# Patient Record
Sex: FEMALE | NOT HISPANIC OR LATINO | URBAN - METROPOLITAN AREA
[De-identification: names, ages, dates, MRNs, and addresses within clinical notes are randomized per-mention and may not be internally consistent; named-entity substitution may affect disease eponyms.]

---
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
## 2024-05-08 ENCOUNTER — OFFICE VISIT (OUTPATIENT)
Age: 24
End: 2024-05-08
Payer: COMMERCIAL

## 2024-05-08 VITALS
TEMPERATURE: 98.3 F | DIASTOLIC BLOOD PRESSURE: 88 MMHG | SYSTOLIC BLOOD PRESSURE: 171 MMHG | HEART RATE: 78 BPM | HEIGHT: 68 IN | RESPIRATION RATE: 18 BRPM | OXYGEN SATURATION: 99 % | WEIGHT: 293 LBS | BODY MASS INDEX: 44.41 KG/M2

## 2024-05-08 DIAGNOSIS — M77.8 THUMB TENDONITIS: Primary | ICD-10-CM

## 2024-05-08 PROCEDURE — G0382 LEV 3 HOSP TYPE B ED VISIT: HCPCS | Performed by: PHYSICIAN ASSISTANT

## 2024-05-08 PROCEDURE — S9083 URGENT CARE CENTER GLOBAL: HCPCS | Performed by: PHYSICIAN ASSISTANT

## 2024-05-08 RX ORDER — FLUOXETINE HYDROCHLORIDE 20 MG/1
CAPSULE ORAL
COMMUNITY
Start: 2024-03-25

## 2024-05-08 RX ORDER — LAMOTRIGINE 100 MG/1
TABLET ORAL
COMMUNITY
Start: 2024-04-01

## 2024-05-08 RX ORDER — ATOMOXETINE 25 MG/1
25 CAPSULE ORAL DAILY
COMMUNITY

## 2024-05-08 RX ORDER — DOXYCYCLINE HYCLATE 50 MG/1
CAPSULE ORAL
COMMUNITY
Start: 2024-02-04

## 2024-05-08 RX ORDER — FLUOXETINE 10 MG/1
CAPSULE ORAL
COMMUNITY
Start: 2024-02-02

## 2024-05-08 NOTE — PROGRESS NOTES
[facility]        NAME: Mignon Liang is a 24 y.o. female  : 2000    MRN: [id number]  DATE: May 8, 2024  TIME: 7:09 PM    Assessment and Plan   No primary diagnosis found.  No diagnosis found.      Patient Instructions       Follow up with PCP in 3-5 days.  Proceed to  ER if symptoms worsen.    If tests have been performed at [facility], our office will contact you with results if changes need to be made to the care plan discussed with you at the visit.  You can review your full results on [facility].    Chief Complaint     Chief Complaint   Patient presents with    Hand Pain     Stated last week. Patient denies any injuries to her L hand. Patient noted swelling on her L hand. Reports taking motrin for the pain. Pain 7/10.          History of Present Illness       HPI    Review of Systems   Review of Systems      Current Medications       Current Outpatient Medications:     atoMOXetine (STRATTERA) 25 mg capsule, Take 25 mg by mouth daily, Disp: , Rfl:     FLUoxetine (PROzac) 20 mg capsule, , Disp: , Rfl:     lamoTRIgine (LaMICtal) 100 mg tablet, , Disp: , Rfl:     doxycycline hyclate (VIBRAMYCIN) 50 mg capsule, , Disp: , Rfl:     FLUoxetine (PROzac) 10 mg capsule, , Disp: , Rfl:     Current Allergies     Allergies as of 2024 - Reviewed 2024   Allergen Reaction Noted    Penicillins Hives and Swelling 2016    Sulfamethoxazole Hives and Rash 2016    Sulfamethoxazole-trimethoprim Hives and Swelling 2016    Dust mite extract Other (See Comments) 2023    Pollen extract Hives 2023    Amoxicillin-pot clavulanate Dermatitis and Rash 2016    Cefdinir Dermatitis, Rash, and Swelling 2016    Clarithromycin Hives 2016            The following portions of the patient's history were reviewed and updated as appropriate: allergies, current medications, past family history, past medical history, past social history, past surgical history and problem  "list.     Past Medical History:   Diagnosis Date    ADHD (attention deficit hyperactivity disorder)     Anxiety     Bipolar affective (HCC)     Depression        Past Surgical History:   Procedure Laterality Date    GALLBLADDER SURGERY      2014    WISDOM TOOTH EXTRACTION         Family History   Problem Relation Age of Onset    Depression Mother     Atrial fibrillation Mother          Medications have been verified.        Objective   BP (!) 171/88   Pulse 78   Temp 98.3 °F (36.8 °C) (Tympanic)   Resp 18   Ht 5' 8\" (1.727 m)   Wt (!) 139 kg (305 lb 6.4 oz)   LMP  (LMP Unknown) Comment: LMP 2 years ago  SpO2 99%   BMI 46.44 kg/m²   No LMP recorded (lmp unknown). (Menstrual status: Birth Control).       Physical Exam     Physical Exam              "    Neurological:      Mental Status: She is alert and oriented to person, place, and time.      Sensory: No sensory deficit.

## 2024-06-18 ENCOUNTER — OFFICE VISIT (OUTPATIENT)
Age: 24
End: 2024-06-18
Payer: COMMERCIAL

## 2024-06-18 ENCOUNTER — APPOINTMENT (OUTPATIENT)
Age: 24
End: 2024-06-18
Payer: COMMERCIAL

## 2024-06-18 VITALS
SYSTOLIC BLOOD PRESSURE: 138 MMHG | RESPIRATION RATE: 20 BRPM | WEIGHT: 290 LBS | TEMPERATURE: 97.9 F | BODY MASS INDEX: 43.95 KG/M2 | OXYGEN SATURATION: 98 % | HEART RATE: 97 BPM | DIASTOLIC BLOOD PRESSURE: 88 MMHG | HEIGHT: 68 IN

## 2024-06-18 DIAGNOSIS — J45.41 MODERATE PERSISTENT ASTHMA WITH ACUTE EXACERBATION: Primary | ICD-10-CM

## 2024-06-18 DIAGNOSIS — L73.2 HIDRADENITIS SUPPURATIVA: ICD-10-CM

## 2024-06-18 PROBLEM — J45.40 MODERATE PERSISTENT ASTHMA WITHOUT COMPLICATION: Status: ACTIVE | Noted: 2017-12-27

## 2024-06-18 LAB
ALBUMIN SERPL BCG-MCNC: 4.2 G/DL (ref 3.5–5)
ALP SERPL-CCNC: 126 U/L (ref 34–104)
ALT SERPL W P-5'-P-CCNC: 16 U/L (ref 7–52)
ANION GAP SERPL CALCULATED.3IONS-SCNC: 10 MMOL/L (ref 4–13)
AST SERPL W P-5'-P-CCNC: 16 U/L (ref 13–39)
BASOPHILS # BLD AUTO: 0.03 THOUSANDS/ÂΜL (ref 0–0.1)
BASOPHILS NFR BLD AUTO: 0 % (ref 0–1)
BILIRUB DIRECT SERPL-MCNC: 0.19 MG/DL (ref 0–0.2)
BILIRUB SERPL-MCNC: 0.71 MG/DL (ref 0.2–1)
BUN SERPL-MCNC: 8 MG/DL (ref 5–25)
CALCIUM SERPL-MCNC: 9.4 MG/DL (ref 8.4–10.2)
CHLORIDE SERPL-SCNC: 107 MMOL/L (ref 96–108)
CHOLEST SERPL-MCNC: 147 MG/DL
CO2 SERPL-SCNC: 24 MMOL/L (ref 21–32)
CREAT SERPL-MCNC: 0.61 MG/DL (ref 0.6–1.3)
EOSINOPHIL # BLD AUTO: 0.12 THOUSAND/ÂΜL (ref 0–0.61)
EOSINOPHIL NFR BLD AUTO: 1 % (ref 0–6)
ERYTHROCYTE [DISTWIDTH] IN BLOOD BY AUTOMATED COUNT: 13.1 % (ref 11.6–15.1)
GFR SERPL CREATININE-BSD FRML MDRD: 129 ML/MIN/1.73SQ M
GLUCOSE P FAST SERPL-MCNC: 88 MG/DL (ref 65–99)
HBV CORE AB SER QL: NORMAL
HBV SURFACE AG SER QL: NORMAL
HCT VFR BLD AUTO: 39.4 % (ref 34.8–46.1)
HCV AB SER QL: NORMAL
HDLC SERPL-MCNC: 47 MG/DL
HGB BLD-MCNC: 13.3 G/DL (ref 11.5–15.4)
IMM GRANULOCYTES # BLD AUTO: 0.04 THOUSAND/UL (ref 0–0.2)
IMM GRANULOCYTES NFR BLD AUTO: 0 % (ref 0–2)
LDLC SERPL CALC-MCNC: 88 MG/DL (ref 0–100)
LYMPHOCYTES # BLD AUTO: 2.29 THOUSANDS/ÂΜL (ref 0.6–4.47)
LYMPHOCYTES NFR BLD AUTO: 19 % (ref 14–44)
MCH RBC QN AUTO: 30.8 PG (ref 26.8–34.3)
MCHC RBC AUTO-ENTMCNC: 33.8 G/DL (ref 31.4–37.4)
MCV RBC AUTO: 91 FL (ref 82–98)
MONOCYTES # BLD AUTO: 0.7 THOUSAND/ÂΜL (ref 0.17–1.22)
MONOCYTES NFR BLD AUTO: 6 % (ref 4–12)
NEUTROPHILS # BLD AUTO: 9.08 THOUSANDS/ÂΜL (ref 1.85–7.62)
NEUTS SEG NFR BLD AUTO: 74 % (ref 43–75)
NONHDLC SERPL-MCNC: 100 MG/DL
NRBC BLD AUTO-RTO: 0 /100 WBCS
PLATELET # BLD AUTO: 363 THOUSANDS/UL (ref 149–390)
PMV BLD AUTO: 12 FL (ref 8.9–12.7)
POTASSIUM SERPL-SCNC: 3.9 MMOL/L (ref 3.5–5.3)
PROT SERPL-MCNC: 6.9 G/DL (ref 6.4–8.4)
RBC # BLD AUTO: 4.32 MILLION/UL (ref 3.81–5.12)
SODIUM SERPL-SCNC: 141 MMOL/L (ref 135–147)
TRIGL SERPL-MCNC: 61 MG/DL
WBC # BLD AUTO: 12.26 THOUSAND/UL (ref 4.31–10.16)

## 2024-06-18 PROCEDURE — 85025 COMPLETE CBC W/AUTO DIFF WBC: CPT

## 2024-06-18 PROCEDURE — 86706 HEP B SURFACE ANTIBODY: CPT

## 2024-06-18 PROCEDURE — 80061 LIPID PANEL: CPT

## 2024-06-18 PROCEDURE — 87340 HEPATITIS B SURFACE AG IA: CPT

## 2024-06-18 PROCEDURE — S9083 URGENT CARE CENTER GLOBAL: HCPCS | Performed by: PHYSICIAN ASSISTANT

## 2024-06-18 PROCEDURE — 36415 COLL VENOUS BLD VENIPUNCTURE: CPT

## 2024-06-18 PROCEDURE — 80048 BASIC METABOLIC PNL TOTAL CA: CPT

## 2024-06-18 PROCEDURE — 80076 HEPATIC FUNCTION PANEL: CPT

## 2024-06-18 PROCEDURE — G0382 LEV 3 HOSP TYPE B ED VISIT: HCPCS | Performed by: PHYSICIAN ASSISTANT

## 2024-06-18 PROCEDURE — 86803 HEPATITIS C AB TEST: CPT

## 2024-06-18 PROCEDURE — 86704 HEP B CORE ANTIBODY TOTAL: CPT

## 2024-06-18 RX ORDER — ALBUTEROL SULFATE 90 UG/1
2 AEROSOL, METERED RESPIRATORY (INHALATION) EVERY 6 HOURS PRN
Qty: 8.5 G | Refills: 0 | Status: SHIPPED | OUTPATIENT
Start: 2024-06-18 | End: 2024-06-20 | Stop reason: SDUPTHER

## 2024-06-18 RX ORDER — BUDESONIDE AND FORMOTEROL FUMARATE DIHYDRATE 160; 4.5 UG/1; UG/1
2 AEROSOL RESPIRATORY (INHALATION) 2 TIMES DAILY
Qty: 10.2 G | Refills: 0 | Status: SHIPPED | OUTPATIENT
Start: 2024-06-18 | End: 2024-06-20 | Stop reason: SDUPTHER

## 2024-06-19 LAB — HBV SURFACE AB SER-ACNC: 13 MIU/ML

## 2024-06-19 RX ORDER — CLINDAMYCIN PHOSPHATE 10 MG/G
GEL TOPICAL
COMMUNITY
Start: 2024-05-22

## 2024-06-19 RX ORDER — CLINDAMYCIN PHOSPHATE 10 UG/ML
LOTION TOPICAL
COMMUNITY
Start: 2024-06-15

## 2024-06-19 RX ORDER — IBUPROFEN 200 MG
200 TABLET ORAL EVERY 6 HOURS PRN
COMMUNITY

## 2024-06-19 RX ORDER — LORATADINE 10 MG/1
10 TABLET ORAL
COMMUNITY

## 2024-06-19 RX ORDER — FEXOFENADINE HCL 180 MG/1
180 TABLET ORAL DAILY PRN
COMMUNITY
End: 2024-06-20 | Stop reason: ALTCHOICE

## 2024-06-19 RX ORDER — ONDANSETRON 4 MG/1
TABLET, ORALLY DISINTEGRATING ORAL
COMMUNITY
Start: 2024-03-20

## 2024-06-20 ENCOUNTER — TELEPHONE (OUTPATIENT)
Dept: ADMINISTRATIVE | Facility: OTHER | Age: 24
End: 2024-06-20

## 2024-06-20 ENCOUNTER — OFFICE VISIT (OUTPATIENT)
Age: 24
End: 2024-06-20
Payer: COMMERCIAL

## 2024-06-20 VITALS
HEIGHT: 68 IN | WEIGHT: 291.2 LBS | DIASTOLIC BLOOD PRESSURE: 82 MMHG | BODY MASS INDEX: 44.13 KG/M2 | OXYGEN SATURATION: 99 % | SYSTOLIC BLOOD PRESSURE: 118 MMHG | HEART RATE: 74 BPM

## 2024-06-20 DIAGNOSIS — E28.2 PCOS (POLYCYSTIC OVARIAN SYNDROME): ICD-10-CM

## 2024-06-20 DIAGNOSIS — J45.41 MODERATE PERSISTENT ASTHMA WITH ACUTE EXACERBATION: ICD-10-CM

## 2024-06-20 DIAGNOSIS — J45.40 MODERATE PERSISTENT ASTHMA WITHOUT COMPLICATION: ICD-10-CM

## 2024-06-20 DIAGNOSIS — Z00.00 WELL ADULT EXAM: ICD-10-CM

## 2024-06-20 DIAGNOSIS — J30.1 ALLERGIC RHINITIS DUE TO POLLEN, UNSPECIFIED SEASONALITY: ICD-10-CM

## 2024-06-20 DIAGNOSIS — F31.0 BIPOLAR AFFECTIVE DISORDER, CURRENT EPISODE HYPOMANIC (HCC): ICD-10-CM

## 2024-06-20 DIAGNOSIS — Z12.4 SCREENING FOR CERVICAL CANCER: ICD-10-CM

## 2024-06-20 DIAGNOSIS — G43.009 MIGRAINE WITHOUT AURA AND WITHOUT STATUS MIGRAINOSUS, NOT INTRACTABLE: ICD-10-CM

## 2024-06-20 DIAGNOSIS — Z11.4 SCREENING FOR HIV (HUMAN IMMUNODEFICIENCY VIRUS): Primary | ICD-10-CM

## 2024-06-20 DIAGNOSIS — L70.0 ACNE VULGARIS: ICD-10-CM

## 2024-06-20 PROBLEM — J30.9 ALLERGIC RHINITIS: Status: ACTIVE | Noted: 2022-01-12

## 2024-06-20 PROBLEM — M25.562 PAIN IN LEFT KNEE: Status: ACTIVE | Noted: 2019-02-25

## 2024-06-20 PROBLEM — D57.3 SICKLE CELL TRAIT (HCC): Status: ACTIVE | Noted: 2021-09-23

## 2024-06-20 PROBLEM — Z91.09 ENVIRONMENTAL ALLERGIES: Status: ACTIVE | Noted: 2017-12-27

## 2024-06-20 PROCEDURE — 99204 OFFICE O/P NEW MOD 45 MIN: CPT | Performed by: FAMILY MEDICINE

## 2024-06-20 PROCEDURE — 99385 PREV VISIT NEW AGE 18-39: CPT | Performed by: FAMILY MEDICINE

## 2024-06-20 RX ORDER — ALBUTEROL SULFATE 90 UG/1
2 AEROSOL, METERED RESPIRATORY (INHALATION) EVERY 6 HOURS PRN
Qty: 8.5 G | Refills: 0 | Status: SHIPPED | OUTPATIENT
Start: 2024-06-20

## 2024-06-20 RX ORDER — ALBUTEROL SULFATE 2.5 MG/3ML
2.5 SOLUTION RESPIRATORY (INHALATION) EVERY 6 HOURS PRN
Qty: 180 ML | Refills: 5 | Status: SHIPPED | OUTPATIENT
Start: 2024-06-20

## 2024-06-20 RX ORDER — BUDESONIDE AND FORMOTEROL FUMARATE DIHYDRATE 160; 4.5 UG/1; UG/1
2 AEROSOL RESPIRATORY (INHALATION) 2 TIMES DAILY
Qty: 10.2 G | Refills: 0 | Status: SHIPPED | OUTPATIENT
Start: 2024-06-20

## 2024-06-20 NOTE — TELEPHONE ENCOUNTER
----- Message from Krystle WASHINGTON sent at 6/20/2024  7:43 AM EDT -----  Regarding: Care Gap Request  HIV  06/20/24 7:43 AM    Hello, our patient attached above has had HIV completed/performed. Please assist in updating the patient chart by pulling the Care Everywhere (CE) document. The date of service is 7/28/2021.     Thank you,  Krystle Guallpa  [facility] PRIMARY CARE

## 2024-06-20 NOTE — LETTER
June 20, 2024     Patient: Mignon Liang  YOB: 2000  Date of Visit: 6/20/2024      To Whom it May Concern:    Mignon Liang is under my professional care. Mignon was seen in my office on 6/20/2024. Please excuse her  for 6/18/2024.    If you have any questions or concerns, please don't hesitate to call.         Sincerely,          ADEEL Gill        CC: No Recipients

## 2024-06-20 NOTE — PROGRESS NOTES
Adult Annual Physical  Name: Mignon Liang      : 2000      MRN: [id number]  Encounter Provider: ADEEL Gill  Encounter Date: 2024   Encounter department: [facility] PRIMARY CARE    Assessment & Plan   1. Screening for HIV (human immunodeficiency virus)  2. Screening for cervical cancer  3. Bipolar affective disorder, current episode hypomanic (HCC)  Assessment & Plan:  Patient sees psychiatrist Bela Marcano.  Lamictal  4. Migraine without aura and without status migrainosus, not intractable  Assessment & Plan:  Imitrex as needed. Patient saw Neurology in the past and had a scan. Per patient was normal.  5. Allergic rhinitis due to pollen, unspecified seasonality  Assessment & Plan:  Controlled with Claritin  6. Moderate persistent asthma without complication  Assessment & Plan:  Controlled with Symbicort and Albuterol  7. PCOS (polycystic ovarian syndrome)  Assessment & Plan:  Birth control helps with periods  8. Body mass index (BMI) of 40.0 to 44.9 in adult ([facility])  Assessment & Plan:  Patient educated on healthy, balanced diet and exercise 30 minutes as needed recommended  Orders:  -     Ambulatory Referral to Weight Management; Future  9. Acne vulgaris  Assessment & Plan:  Patient goes to [facility] .   10. Moderate persistent asthma with acute exacerbation  -     albuterol (ProAir HFA) 90 mcg/act inhaler; Inhale 2 puffs every 6 (six) hours as needed for wheezing  -     budesonide-formoterol (Symbicort) 160-4.5 mcg/act inhaler; Inhale 2 puffs 2 (two) times a day Rinse mouth after use.  -     albuterol (2.5 mg/3 mL) 0.083 % nebulizer solution; Take 3 mL (2.5 mg total) by nebulization every 6 (six) hours as needed for wheezing or shortness of breath  11. Well adult exam    Immunizations and preventive care screenings were discussed with patient today. Appropriate education was printed on patient's after visit summary.    Counseling:  Exercise: the importance of  "regular exercise/physical activity was discussed. Recommend exercise 3-5 times per week for at least 30 minutes.          History of Present Illness     Adult Annual Physical  Review of Systems      Objective     /82 (BP Location: Right arm, Patient Position: Sitting, Cuff Size: Standard)   Pulse 74   Ht 5' 8\" (1.727 m)   Wt 132 kg (291 lb 3.2 oz)   SpO2 99%   BMI 44.28 kg/m²     Physical Exam  Administrative Statements   Adult Annual Physical  Name: Mignon Liang      : 2000      MRN: [id number]  Encounter Provider: ADEEL Gill  Encounter Date: 2024   Encounter department: [facility] PRIMARY CARE    Assessment & Plan   1. Screening for HIV (human immunodeficiency virus)  2. Screening for cervical cancer  3. Bipolar affective disorder, current episode hypomanic (HCC)    Immunizations and preventive care screenings were discussed with patient today. Appropriate education was printed on patient's after visit summary.    Counseling:  Exercise: the importance of regular exercise/physical activity was discussed. Recommend exercise 3-5 times per week for at least 30 minutes.          History of Present Illness     Adult Annual Physical:  Patient presents for annual physical.     Diet and Physical Activity:  - Diet/Nutrition: well balanced diet.  - Exercise: walking.    General Health:  - Sleep: sleeps well.  - Hearing: decreased hearing bilateral ears.  - Vision: wears glasses.  - Dental: brushes teeth twice daily.    /GYN Health:  - Follows with GYN: yes.   - Contraception:. Has IUD      Advanced Care Planning:  - Has an advanced directive?: no    - Has a durable medical POA?: no    - ACP document given to patient?: yes      Review of Systems   Constitutional:  Negative for chills and fever.   HENT:  Negative for ear pain and sore throat.    Eyes:  Negative for pain and visual disturbance.   Respiratory:  Negative for cough and shortness of breath.         Asthma flare " "ups in Summer and Fall   Cardiovascular:  Negative for chest pain and palpitations.   Gastrointestinal:  Negative for abdominal pain and vomiting.   Genitourinary:  Negative for dysuria and hematuria.   Musculoskeletal:  Negative for arthralgias and back pain.   Skin:  Negative for color change and rash.   Neurological:  Negative for seizures and syncope.   All other systems reviewed and are negative.        Objective     /82 (BP Location: Right arm, Patient Position: Sitting, Cuff Size: Standard)   Pulse 74   Ht 5' 8\" (1.727 m)   Wt 132 kg (291 lb 3.2 oz)   SpO2 99%   BMI 44.28 kg/m²     Physical Exam  Vitals and nursing note reviewed.   Constitutional:       General: She is not in acute distress.     Appearance: She is well-developed. She is obese.   HENT:      Head: Normocephalic and atraumatic.   Eyes:      Conjunctiva/sclera: Conjunctivae normal.   Cardiovascular:      Rate and Rhythm: Normal rate and regular rhythm.      Heart sounds: No murmur heard.  Pulmonary:      Effort: Pulmonary effort is normal. No respiratory distress.      Breath sounds: Normal breath sounds.   Abdominal:      Palpations: Abdomen is soft.      Tenderness: There is no abdominal tenderness.   Musculoskeletal:         General: No swelling.      Cervical back: Neck supple.   Skin:     General: Skin is warm and dry.      Capillary Refill: Capillary refill takes less than 2 seconds.   Neurological:      Mental Status: She is alert.   Psychiatric:         Mood and Affect: Mood normal.       Administrative Statements     "

## 2024-06-20 NOTE — TELEPHONE ENCOUNTER
Upon review of the In Basket request we were able to locate, review, and update the patient chart as requested for HIV.    Any additional questions or concerns should be emailed to the Practice Liaisons via the appropriate education email address, please do not reply via In Basket.    Thank you  YVONNE GARSIA   [facility]

## 2024-06-24 NOTE — PROGRESS NOTES
[facility]        NAME: Mignon Liang is a 24 y.o. female  : 2000    MRN: [id number]  DATE: 2024  TIME: 3:11 PM    Assessment and Plan   Moderate persistent asthma with acute exacerbation [J45.41]  1. Moderate persistent asthma with acute exacerbation  DISCONTINUED: albuterol (ProAir HFA) 90 mcg/act inhaler    DISCONTINUED: budesonide-formoterol (Symbicort) 160-4.5 mcg/act inhaler            Patient Instructions     Pt has been dealing with an acute asthma exacerbation. She is not currently in any respiratory distress. I gave her one time prescriptions of a rescue inhaler and Symbicort. She should follow up with PCP for ongoing management.   Follow up with PCP in 3-5 days.  Proceed to  ER if symptoms worsen.    If tests have been performed at [facility], our office will contact you with results if changes need to be made to the care plan discussed with you at the visit.  You can review your full results on [facility].    Chief Complaint     Chief Complaint   Patient presents with    Asthma     Worsening from heat, needs work note         History of Present Illness       Pt presents with what she reports is uncontrolled asthma. She has used Albuterol and Symbicort in the past but meds have run out/ and she needs refills. She reports her asthma is normally well-controlled on the inhalers but has gotten worse with the heat. She is planning on following up with her PCP.         Review of Systems   Review of Systems   Constitutional: Negative.    Respiratory:  Positive for shortness of breath and wheezing.    Cardiovascular: Negative.    Gastrointestinal: Negative.    Genitourinary: Negative.          Current Medications       Current Outpatient Medications:     FLUoxetine (PROzac) 20 mg capsule, , Disp: , Rfl:     lamoTRIgine (LaMICtal) 100 mg tablet, , Disp: , Rfl:     albuterol (2.5 mg/3 mL) 0.083 % nebulizer solution, Take 3 mL (2.5 mg total) by nebulization every 6 (six)  hours as needed for wheezing or shortness of breath, Disp: 180 mL, Rfl: 5    albuterol (ProAir HFA) 90 mcg/act inhaler, Inhale 2 puffs every 6 (six) hours as needed for wheezing, Disp: 8.5 g, Rfl: 0    budesonide-formoterol (Symbicort) 160-4.5 mcg/act inhaler, Inhale 2 puffs 2 (two) times a day Rinse mouth after use., Disp: 10.2 g, Rfl: 0    clindamycin (CLEOCIN T) 1 % lotion, , Disp: , Rfl:     clindamycin 1 % gel, , Disp: , Rfl:     doxycycline hyclate (VIBRAMYCIN) 50 mg capsule, , Disp: , Rfl:     FLUoxetine (PROzac) 10 mg capsule, , Disp: , Rfl:     ibuprofen (MOTRIN) 200 mg tablet, Take 200 mg by mouth every 6 (six) hours as needed, Disp: , Rfl:     loratadine (CLARITIN) 10 mg tablet, Take 10 mg by mouth, Disp: , Rfl:     ondansetron (ZOFRAN-ODT) 4 mg disintegrating tablet, , Disp: , Rfl:     tretinoin (RETIN-A) 0.025 % cream, , Disp: , Rfl:     Current Allergies     Allergies as of 06/18/2024 - Reviewed 06/18/2024   Allergen Reaction Noted    Penicillins Hives and Swelling 09/07/2016    Sulfamethoxazole Hives and Rash 09/07/2016    Sulfamethoxazole-trimethoprim Hives and Swelling 01/02/2016    Dust mite extract Other (See Comments) 12/07/2023    Pollen extract Hives 12/07/2023    Amoxicillin-pot clavulanate Dermatitis and Rash 09/07/2016    Cefdinir Dermatitis, Rash, and Swelling 09/07/2016    Clarithromycin Hives 09/07/2016            The following portions of the patient's history were reviewed and updated as appropriate: allergies, current medications, past family history, past medical history, past social history, past surgical history and problem list.     Past Medical History:   Diagnosis Date    ADHD (attention deficit hyperactivity disorder)     Anxiety     Bipolar affective (HCC)     Depression        Past Surgical History:   Procedure Laterality Date    GALLBLADDER SURGERY      2014    WISDOM TOOTH EXTRACTION         Family History   Problem Relation Age of Onset    Depression Mother     Atrial  "fibrillation Mother          Medications have been verified.        Objective   /88   Pulse 97   Temp 97.9 °F (36.6 °C) (Tympanic)   Resp 20   Ht 5' 8\" (1.727 m)   Wt 132 kg (290 lb)   SpO2 98%   BMI 44.09 kg/m²   No LMP recorded. (Menstrual status: Birth Control).       Physical Exam     Physical Exam  Vitals reviewed.   Constitutional:       General: She is not in acute distress.     Appearance: She is well-developed.   HENT:      Mouth/Throat:      Mouth: Mucous membranes are moist.      Pharynx: Oropharynx is clear.   Cardiovascular:      Rate and Rhythm: Normal rate and regular rhythm.      Pulses: Normal pulses.      Heart sounds: Normal heart sounds. No murmur heard.  Pulmonary:      Effort: Pulmonary effort is normal. No respiratory distress.      Breath sounds: Rhonchi (B/L diffuse mildly coarse breath sounds with decreased air movement) present. No wheezing.   Neurological:      Mental Status: She is alert and oriented to person, place, and time.                   "

## 2024-07-16 ENCOUNTER — OFFICE VISIT (OUTPATIENT)
Age: 24
End: 2024-07-16
Payer: COMMERCIAL

## 2024-07-16 VITALS
HEART RATE: 82 BPM | SYSTOLIC BLOOD PRESSURE: 116 MMHG | DIASTOLIC BLOOD PRESSURE: 85 MMHG | BODY MASS INDEX: 43.44 KG/M2 | WEIGHT: 286.6 LBS | OXYGEN SATURATION: 97 % | HEIGHT: 68 IN

## 2024-07-16 DIAGNOSIS — G43.009 MIGRAINE WITHOUT AURA AND WITHOUT STATUS MIGRAINOSUS, NOT INTRACTABLE: Primary | ICD-10-CM

## 2024-07-16 DIAGNOSIS — L70.0 ACNE VULGARIS: ICD-10-CM

## 2024-07-16 DIAGNOSIS — J45.40 MODERATE PERSISTENT ASTHMA WITHOUT COMPLICATION: ICD-10-CM

## 2024-07-16 DIAGNOSIS — J30.1 ALLERGIC RHINITIS DUE TO POLLEN, UNSPECIFIED SEASONALITY: ICD-10-CM

## 2024-07-16 DIAGNOSIS — F31.0 BIPOLAR AFFECTIVE DISORDER, CURRENT EPISODE HYPOMANIC (HCC): ICD-10-CM

## 2024-07-16 DIAGNOSIS — E28.2 PCOS (POLYCYSTIC OVARIAN SYNDROME): ICD-10-CM

## 2024-07-16 DIAGNOSIS — J45.41 MODERATE PERSISTENT ASTHMA WITH ACUTE EXACERBATION: ICD-10-CM

## 2024-07-16 PROCEDURE — 99214 OFFICE O/P EST MOD 30 MIN: CPT | Performed by: FAMILY MEDICINE

## 2024-07-16 RX ORDER — BUDESONIDE AND FORMOTEROL FUMARATE DIHYDRATE 160; 4.5 UG/1; UG/1
2 AEROSOL RESPIRATORY (INHALATION) 2 TIMES DAILY
Qty: 10.2 G | Refills: 0 | Status: SHIPPED | OUTPATIENT
Start: 2024-07-16

## 2024-07-16 RX ORDER — PREDNISONE 20 MG/1
20 TABLET ORAL 2 TIMES DAILY WITH MEALS
Qty: 10 TABLET | Refills: 0 | Status: SHIPPED | OUTPATIENT
Start: 2024-07-16

## 2024-07-16 NOTE — LETTER
July 18, 2024     Patient: Mignon Liang  YOB: 2000  Date of Visit: 7/16/2024      To Whom it May Concern:    Mignon Liang is under my professional care. Mignon was seen in my office on 7/16/2024. Mignon may return to work on 07/19/24 .    If you have any questions or concerns, please don't hesitate to call.         Sincerely,          ADEEL Gill        CC: No Recipients   no

## 2024-07-16 NOTE — LETTER
July 16, 2024     Patient: Mignon Liang  YOB: 2000  Date of Visit: 7/16/2024      To Whom it May Concern:    Mignon Liang is under my professional care. Mignon was seen in my office on 7/16/2024. Mignon may return to work on 7/17/24 .    If you have any questions or concerns, please don't hesitate to call.         Sincerely,          ADEEL Gill        CC: No Recipients

## 2024-07-16 NOTE — PROGRESS NOTES
Ambulatory Visit  Name: Mignon Liang      : 2000      MRN: [id number]  Encounter Provider: ADEEL Gill  Encounter Date: 2024   Encounter department: [facility] PRIMARY CARE    Assessment & Plan   1. Migraine without aura and without status migrainosus, not intractable  Assessment & Plan:  Imitrex as needed. Patient saw Neurology in the past and had a scan. Per patient was normal   2. Allergic rhinitis due to pollen, unspecified seasonality  Assessment & Plan:  Controlled with Claritin  3. Moderate persistent asthma without complication  Assessment & Plan:  Controlled with Symbicort and Albuterol  4. PCOS (polycystic ovarian syndrome)  Assessment & Plan:  Birth control helps with periods  5. Acne vulgaris  Assessment & Plan:  Patient goes to [facility] .   6. Bipolar affective disorder, current episode hypomanic (HCC)  Assessment & Plan:  Patient sees psychiatrist Bela Marcano. Controlled with Lamictal  7. Moderate persistent asthma with acute exacerbation  -     budesonide-formoterol (Symbicort) 160-4.5 mcg/act inhaler; Inhale 2 puffs 2 (two) times a day Rinse mouth after use.  -     predniSONE 20 mg tablet; Take 1 tablet (20 mg total) by mouth 2 (two) times a day with meals         History of Present Illness       Mignon Liang is here for chronic conditions f/u. Pt. had labs done prior to today's visit which included Recent Results (from the past 672 hour(s)).  patient started not feeling well. She reports increasing cough to the point it is hard to sleep. She is coughing up clear mucus with greenish tint. Denies fever, chills. Some body aches. Patient has been using her rescue inhaler and nebulizer with some improvement.  -Basic metabolic panel:   Collection Time: 24  2:58 PM       Result                      Value             Ref Range           Sodium                      141               135 - 147 mm*       Potassium                   3.9                3.5 - 5.3 mm*       Chloride                    107               96 - 108 mmo*       CO2                         24                21 - 32 mmol*       ANION GAP                   10                4 - 13 mmol/L       BUN                         8                 5 - 25 mg/dL        Creatinine                  0.61              0.60 - 1.30 *       Glucose, Fasting            88                65 - 99 mg/dL       Calcium                     9.4               8.4 - 10.2 m*       eGFR                        129               ml/min/1.73s*  -CBC and differential:   Collection Time: 06/18/24  2:58 PM       Result                      Value             Ref Range           WBC                         12.26 (H)         4.31 - 10.16*       RBC                         4.32              3.81 - 5.12 *       Hemoglobin                  13.3              11.5 - 15.4 *       Hematocrit                  39.4              34.8 - 46.1 %       MCV                         91                82 - 98 fL          MCH                         30.8              26.8 - 34.3 *       MCHC                        33.8              31.4 - 37.4 *       RDW                         13.1              11.6 - 15.1 %       MPV                         12.0              8.9 - 12.7 fL       Platelets                   363               149 - 390 Th*       nRBC                        0                 /100 WBCs           Segmented %                 74                43 - 75 %           Immature Grans %            0                 0 - 2 %             Lymphocytes %               19                14 - 44 %           Monocytes %                 6                 4 - 12 %            Eosinophils Relative        1                 0 - 6 %             Basophils Relative          0                 0 - 1 %             Absolute Neutrophils        9.08 (H)          1.85 - 7.62 *       Absolute Immature Grans     0.04              0.00 - 0.20 *       Absolute  Lymphocytes        2.29              0.60 - 4.47 *       Absolute Monocytes          0.70              0.17 - 1.22 *       Eosinophils Absolute        0.12              0.00 - 0.61 *       Basophils Absolute          0.03              0.00 - 0.10 *  -Hepatic function panel:   Collection Time: 06/18/24  2:58 PM       Result                      Value             Ref Range           Total Bilirubin             0.71              0.20 - 1.00 *       Bilirubin, Direct           0.19              0.00 - 0.20 *       Alkaline Phosphatase        126 (H)           34 - 104 U/L        AST                         16                13 - 39 U/L         ALT                         16                7 - 52 U/L          Total Protein               6.9               6.4 - 8.4 g/*       Albumin                     4.2               3.5 - 5.0 g/*  -Hepatitis B core antibody, total:   Collection Time: 06/18/24  2:58 PM       Result                      Value             Ref Range           Hep B Core Total Ab         Non-reactive      Non-Reactive   -Hepatitis B surface antigen:   Collection Time: 06/18/24  2:58 PM       Result                      Value             Ref Range           Hepatitis B Surface Ag      Non-reactive      Non-Reactive   -Hepatitis B surface antibody:   Collection Time: 06/18/24  2:58 PM       Result                      Value             Ref Range           Hep B S Ab                  13.00             3-500 mIU/mL*  -Hepatitis C antibody:   Collection Time: 06/18/24  2:58 PM       Result                      Value             Ref Range           Hepatitis C Ab              Non-reactive      Non-Reactive   -Lipid panel:   Collection Time: 06/18/24  2:58 PM       Result                      Value             Ref Range           Cholesterol                 147               See Comment *       Triglycerides               61                See Comment *       HDL, Direct                 47 (L)            >=50  mg/dL          LDL Calculated              88                0 - 100 mg/dL       Non-HDL-Chol (CHOL-HDL)     100               mg/dl                Review of Systems   Constitutional:  Negative for chills and fever.   HENT:  Positive for congestion. Negative for ear pain and sore throat.    Eyes:  Negative for pain and visual disturbance.   Respiratory:  Positive for wheezing. Negative for cough (Productive of  white phlegm) and shortness of breath.    Cardiovascular:  Negative for chest pain and palpitations.   Gastrointestinal:  Negative for abdominal pain and vomiting.   Genitourinary:  Negative for dysuria and hematuria.   Musculoskeletal:  Negative for back pain.   Skin:  Negative for color change and rash.   Neurological:  Negative for seizures and syncope.   All other systems reviewed and are negative.    Past Medical History:   Diagnosis Date    ADHD (attention deficit hyperactivity disorder)     Anxiety     Bipolar affective (HCC)     Depression      Past Surgical History:   Procedure Laterality Date    GALLBLADDER SURGERY      2014    WISDOM TOOTH EXTRACTION       Family History   Problem Relation Age of Onset    Depression Mother     Atrial fibrillation Mother      Social History     Tobacco Use    Smoking status: Never     Passive exposure: Never    Smokeless tobacco: Never   Vaping Use    Vaping status: Never Used   Substance and Sexual Activity    Alcohol use: Yes     Comment: occasionally    Drug use: Yes     Types: Marijuana     Comment: patient has medical marijuana card    Sexual activity: Not on file     Current Outpatient Medications on File Prior to Visit   Medication Sig    albuterol (2.5 mg/3 mL) 0.083 % nebulizer solution Take 3 mL (2.5 mg total) by nebulization every 6 (six) hours as needed for wheezing or shortness of breath    albuterol (ProAir HFA) 90 mcg/act inhaler Inhale 2 puffs every 6 (six) hours as needed for wheezing    clindamycin (CLEOCIN T) 1 % lotion     clindamycin 1 % gel      doxycycline hyclate (VIBRAMYCIN) 50 mg capsule  (Patient not taking: Reported on 5/8/2024)    FLUoxetine (PROzac) 10 mg capsule  (Patient not taking: Reported on 5/8/2024)    FLUoxetine (PROzac) 20 mg capsule     ibuprofen (MOTRIN) 200 mg tablet Take 200 mg by mouth every 6 (six) hours as needed    lamoTRIgine (LaMICtal) 100 mg tablet     loratadine (CLARITIN) 10 mg tablet Take 10 mg by mouth    ondansetron (ZOFRAN-ODT) 4 mg disintegrating tablet     tretinoin (RETIN-A) 0.025 % cream     [DISCONTINUED] budesonide-formoterol (Symbicort) 160-4.5 mcg/act inhaler Inhale 2 puffs 2 (two) times a day Rinse mouth after use.     Allergies   Allergen Reactions    Penicillins Hives and Swelling     Other reaction(s): Swelling    Sulfamethoxazole Hives and Rash    Sulfamethoxazole-Trimethoprim Hives and Swelling    Dust Mite Extract Other (See Comments)    Pollen Extract Hives    Amoxicillin-Pot Clavulanate Dermatitis and Rash    Cefdinir Dermatitis, Rash and Swelling     Patient developed swelling of the lips and rash chest ,back and face    Clarithromycin Hives     asthma attack Asthma Attack      Asthma Attack      asthma attack     Immunization History   Administered Date(s) Administered    COVID-19 PFIZER VACCINE 0.3 ML IM 03/24/2021, 04/17/2021, 10/17/2021    DTaP 2002, 2002, 2002, 05/14/2003, 03/27/2006    HPV Quadrivalent 08/19/2013, 03/27/2014, 05/08/2014    Hep A, ped/adol, 2 dose 03/27/2014, 06/20/2017    Hep B, Adolescent or Pediatric 2002, 2002, 05/14/2003    Hepatitis A 03/27/2014    INFLUENZA 09/25/2013, 10/11/2016, 12/27/2017, 10/01/2018, 09/16/2019, 04/13/2021, 10/17/2021    IPV 2002, 2002, 05/14/2003, 03/27/2014, 03/27/2014    MMR 10/26/2004, 03/28/2007    Meningococcal B, OMV (BEXSERO) 08/14/2019, 09/16/2019    Meningococcal MCV4, Unspecified 08/19/2013, 08/14/2019    Tdap 08/19/2013, 01/10/2017, 07/30/2022    Varicella 10/26/2004, 04/30/2012     Objective     BP  "116/85 (BP Location: Left arm, Patient Position: Sitting, Cuff Size: Large)   Pulse 82   Ht 5' 8\" (1.727 m)   Wt 130 kg (286 lb 9.6 oz)   SpO2 97%   BMI 43.58 kg/m²     Physical Exam  Vitals and nursing note reviewed.   Constitutional:       General: She is not in acute distress.     Appearance: She is well-developed. She is obese.   HENT:      Head: Normocephalic and atraumatic.      Nose:      Left Turbinates: Swollen.      Mouth/Throat:      Pharynx: Posterior oropharyngeal erythema present.   Eyes:      Conjunctiva/sclera: Conjunctivae normal.   Cardiovascular:      Rate and Rhythm: Normal rate and regular rhythm.      Heart sounds: No murmur heard.  Pulmonary:      Effort: Pulmonary effort is normal. No respiratory distress.      Breath sounds: Normal breath sounds.   Abdominal:      Palpations: Abdomen is soft.      Tenderness: There is no abdominal tenderness.   Musculoskeletal:         General: No swelling.      Cervical back: Neck supple.   Skin:     General: Skin is warm and dry.      Capillary Refill: Capillary refill takes less than 2 seconds.   Neurological:      Mental Status: She is alert.   Psychiatric:         Mood and Affect: Mood normal.         "

## 2024-07-30 ENCOUNTER — OFFICE VISIT (OUTPATIENT)
Age: 24
End: 2024-07-30
Payer: COMMERCIAL

## 2024-07-30 VITALS
HEIGHT: 68 IN | DIASTOLIC BLOOD PRESSURE: 86 MMHG | OXYGEN SATURATION: 97 % | WEIGHT: 287.4 LBS | BODY MASS INDEX: 43.56 KG/M2 | TEMPERATURE: 97.9 F | SYSTOLIC BLOOD PRESSURE: 127 MMHG | HEART RATE: 82 BPM

## 2024-07-30 DIAGNOSIS — J30.1 ALLERGIC RHINITIS DUE TO POLLEN, UNSPECIFIED SEASONALITY: ICD-10-CM

## 2024-07-30 DIAGNOSIS — J02.9 SORE THROAT: Primary | ICD-10-CM

## 2024-07-30 LAB — S PYO AG THROAT QL: NEGATIVE

## 2024-07-30 PROCEDURE — 87880 STREP A ASSAY W/OPTIC: CPT | Performed by: FAMILY MEDICINE

## 2024-07-30 PROCEDURE — 99213 OFFICE O/P EST LOW 20 MIN: CPT | Performed by: FAMILY MEDICINE

## 2024-07-30 RX ORDER — DOXYCYCLINE HYCLATE 100 MG
100 TABLET ORAL 2 TIMES DAILY
Qty: 14 TABLET | Refills: 0 | Status: SHIPPED | OUTPATIENT
Start: 2024-07-30 | End: 2024-08-06

## 2024-07-30 NOTE — PROGRESS NOTES
Ambulatory Visit  Name: Mignon Liang      : 2000      MRN: [id number]  Encounter Provider: ADEEL Gill  Encounter Date: 2024   Encounter department: [facility] PRIMARY CARE    Assessment & Plan   1. Sore throat  -     POCT rapid ANTIGEN strepA         History of Present Illness     Saturday patient started with headache, lose of voice, throat pain. No feevr. Patient was using Mucinex and Dayquil with some improveemnt in symptoms. Patient is covid and flu vaccinated. No sick contact.    Headache    Review of Systems   Constitutional:  Positive for fatigue.   HENT:  Positive for congestion, ear pain, rhinorrhea, sinus pressure, sinus pain, sneezing, sore throat, trouble swallowing and voice change.    Respiratory:  Positive for cough.    Neurological:  Positive for headaches.     Past Medical History:   Diagnosis Date    ADHD (attention deficit hyperactivity disorder)     Anxiety     Bipolar affective (HCC)     Depression      Past Surgical History:   Procedure Laterality Date    GALLBLADDER SURGERY      2014    WISDOM TOOTH EXTRACTION       Family History   Problem Relation Age of Onset    Depression Mother     Atrial fibrillation Mother      Social History     Tobacco Use    Smoking status: Never     Passive exposure: Never    Smokeless tobacco: Never   Vaping Use    Vaping status: Never Used   Substance and Sexual Activity    Alcohol use: Yes     Comment: occasionally    Drug use: Yes     Types: Marijuana     Comment: patient has medical marijuana card    Sexual activity: Not on file     Current Outpatient Medications on File Prior to Visit   Medication Sig    albuterol (2.5 mg/3 mL) 0.083 % nebulizer solution Take 3 mL (2.5 mg total) by nebulization every 6 (six) hours as needed for wheezing or shortness of breath    albuterol (ProAir HFA) 90 mcg/act inhaler Inhale 2 puffs every 6 (six) hours as needed for wheezing    budesonide-formoterol (Symbicort) 160-4.5 mcg/act inhaler  Inhale 2 puffs 2 (two) times a day Rinse mouth after use.    clindamycin (CLEOCIN T) 1 % lotion     clindamycin 1 % gel     FLUoxetine (PROzac) 20 mg capsule     ibuprofen (MOTRIN) 200 mg tablet Take 200 mg by mouth every 6 (six) hours as needed    lamoTRIgine (LaMICtal) 100 mg tablet     loratadine (CLARITIN) 10 mg tablet Take 10 mg by mouth    ondansetron (ZOFRAN-ODT) 4 mg disintegrating tablet     predniSONE 20 mg tablet Take 1 tablet (20 mg total) by mouth 2 (two) times a day with meals    tretinoin (RETIN-A) 0.025 % cream     doxycycline hyclate (VIBRAMYCIN) 50 mg capsule  (Patient not taking: Reported on 5/8/2024)    FLUoxetine (PROzac) 10 mg capsule  (Patient not taking: Reported on 5/8/2024)     Allergies   Allergen Reactions    Penicillins Hives and Swelling     Other reaction(s): Swelling    Sulfamethoxazole Hives and Rash    Sulfamethoxazole-Trimethoprim Hives and Swelling    Dust Mite Extract Other (See Comments)    Pollen Extract Hives    Amoxicillin-Pot Clavulanate Dermatitis and Rash    Cefdinir Dermatitis, Rash and Swelling     Patient developed swelling of the lips and rash chest ,back and face    Clarithromycin Hives     asthma attack Asthma Attack      Asthma Attack      asthma attack     Immunization History   Administered Date(s) Administered    COVID-19 PFIZER VACCINE 0.3 ML IM 03/24/2021, 04/17/2021, 10/17/2021    DTaP 2002, 2002, 2002, 05/14/2003, 03/27/2006    HPV Quadrivalent 08/19/2013, 03/27/2014, 05/08/2014    Hep A, ped/adol, 2 dose 03/27/2014, 06/20/2017    Hep B, Adolescent or Pediatric 2002, 2002, 05/14/2003    Hepatitis A 03/27/2014    INFLUENZA 09/25/2013, 10/11/2016, 12/27/2017, 10/01/2018, 09/16/2019, 04/13/2021, 10/17/2021    IPV 2002, 2002, 05/14/2003, 03/27/2014, 03/27/2014    MMR 10/26/2004, 03/28/2007    Meningococcal B, OMV (BEXSERO) 08/14/2019, 09/16/2019    Meningococcal MCV4, Unspecified 08/19/2013, 08/14/2019    Tdap 08/19/2013,  "01/10/2017, 07/30/2022    Varicella 10/26/2004, 04/30/2012     Objective     /86 (BP Location: Left arm, Patient Position: Sitting, Cuff Size: Standard)   Pulse 82   Temp 97.9 °F (36.6 °C)   Ht 5' 8\" (1.727 m)   Wt 130 kg (287 lb 6.4 oz)   SpO2 97%   BMI 43.70 kg/m²     Physical Exam  Vitals and nursing note reviewed.   Constitutional:       General: She is not in acute distress.     Appearance: She is well-developed. She is obese.   HENT:      Head: Normocephalic and atraumatic.      Nose:      Right Turbinates: Swollen.      Left Turbinates: Swollen.      Mouth/Throat:      Pharynx: Posterior oropharyngeal erythema present.   Eyes:      Conjunctiva/sclera: Conjunctivae normal.   Cardiovascular:      Rate and Rhythm: Normal rate and regular rhythm.      Heart sounds: No murmur heard.  Pulmonary:      Effort: Pulmonary effort is normal. No respiratory distress.      Breath sounds: Normal breath sounds.   Abdominal:      Palpations: Abdomen is soft.      Tenderness: There is no abdominal tenderness.   Musculoskeletal:         General: No swelling.      Cervical back: Neck supple.   Skin:     General: Skin is warm and dry.      Capillary Refill: Capillary refill takes less than 2 seconds.   Neurological:      Mental Status: She is alert.   Psychiatric:         Mood and Affect: Mood normal.         "

## 2024-09-19 RX ORDER — LAMOTRIGINE 100 MG/1
TABLET ORAL
Refills: 0 | OUTPATIENT
Start: 2024-09-19

## 2024-10-11 ENCOUNTER — OFFICE VISIT (OUTPATIENT)
Age: 24
End: 2024-10-11
Payer: COMMERCIAL

## 2024-10-11 VITALS
SYSTOLIC BLOOD PRESSURE: 118 MMHG | HEIGHT: 68 IN | WEIGHT: 281.6 LBS | BODY MASS INDEX: 42.68 KG/M2 | DIASTOLIC BLOOD PRESSURE: 82 MMHG | RESPIRATION RATE: 18 BRPM | HEART RATE: 81 BPM | TEMPERATURE: 97.1 F | OXYGEN SATURATION: 98 %

## 2024-10-11 DIAGNOSIS — Z00.6 ENCOUNTER FOR EXAMINATION FOR NORMAL COMPARISON OR CONTROL IN CLINICAL RESEARCH PROGRAM: ICD-10-CM

## 2024-10-11 DIAGNOSIS — J01.00 ACUTE NON-RECURRENT MAXILLARY SINUSITIS: Primary | ICD-10-CM

## 2024-10-11 PROCEDURE — G0382 LEV 3 HOSP TYPE B ED VISIT: HCPCS | Performed by: NURSE PRACTITIONER

## 2024-10-11 PROCEDURE — S9083 URGENT CARE CENTER GLOBAL: HCPCS | Performed by: NURSE PRACTITIONER

## 2024-10-11 RX ORDER — DOXYCYCLINE 100 MG/1
100 CAPSULE ORAL 2 TIMES DAILY
Qty: 14 CAPSULE | Refills: 0 | Status: SHIPPED | OUTPATIENT
Start: 2024-10-11 | End: 2024-10-18

## 2024-10-11 RX ORDER — METHYLPREDNISOLONE 4 MG
TABLET, DOSE PACK ORAL
Qty: 21 EACH | Refills: 0 | Status: SHIPPED | OUTPATIENT
Start: 2024-10-11

## 2024-10-11 RX ORDER — FLUTICASONE PROPIONATE 50 MCG
2 SPRAY, SUSPENSION (ML) NASAL DAILY
Qty: 9.9 ML | Refills: 0 | Status: SHIPPED | OUTPATIENT
Start: 2024-10-11 | End: 2024-11-10

## 2024-10-11 NOTE — PROGRESS NOTES
[facility]        NAME: Mignon Liang is a 24 y.o. female  : 2000    MRN: [id number]  DATE: 2024  TIME: 6:37 PM    Assessment and Plan   Acute non-recurrent maxillary sinusitis [J01.00]  1. Acute non-recurrent maxillary sinusitis  doxycycline monohydrate (MONODOX) 100 mg capsule    methylPREDNISolone 4 MG tablet therapy pack    fluticasone (FLONASE) 50 mcg/act nasal spray        Acute symptomatic not responding conservative treatments will start Flonase 2 sprays each nostril once daily Medrol Dosepak and Doxy twice daily x 7 days educated on side effects proper use medication follow-up with primary care with worsening symptoms no improvement    Patient Instructions       Follow up with PCP in 3-5 days.  Proceed to  ER if symptoms worsen.    If tests have been performed at [facility], our office will contact you with results if changes need to be made to the care plan discussed with you at the visit.  You can [facility] full [facility].    Chief Complaint     Chief Complaint   Patient presents with    Sinusitis     Sinus pressure and congestion. Feels a lot of pressure in face and ears. Started last Friday.         History of Present Illness       Patient is a 24-year-old female arrives with complaints of sinus pressure pain postnasal drainage bilateral ear pain congestion and slight cough.  Started last Friday worsening within the last couple days.  Denies shortness of breath chest pain fever.        Review of Systems   Review of Systems   Constitutional:  Negative for activity change, appetite change, chills, fatigue and fever.   HENT:  Positive for congestion, postnasal drip, sinus pressure and sinus pain. Negative for rhinorrhea, sneezing and sore throat.    Respiratory:  Positive for cough. Negative for chest tightness, shortness of breath and wheezing.    Cardiovascular:  Negative for chest pain and palpitations.   Gastrointestinal:  Negative for abdominal  pain, constipation, diarrhea, nausea and vomiting.   Musculoskeletal:  Negative for arthralgias and myalgias.   Skin:  Negative for color change, pallor and rash.   Neurological:  Negative for dizziness, weakness, light-headedness and headaches.   Hematological:  Negative for adenopathy.   Psychiatric/Behavioral:  Negative for agitation and confusion.          Current Medications       Current Outpatient Medications:     albuterol (2.5 mg/3 mL) 0.083 % nebulizer solution, Take 3 mL (2.5 mg total) by nebulization every 6 (six) hours as needed for wheezing or shortness of breath, Disp: 180 mL, Rfl: 5    albuterol (ProAir HFA) 90 mcg/act inhaler, Inhale 2 puffs every 6 (six) hours as needed for wheezing, Disp: 8.5 g, Rfl: 0    budesonide-formoterol (Symbicort) 160-4.5 mcg/act inhaler, Inhale 2 puffs 2 (two) times a day Rinse mouth after use., Disp: 10.2 g, Rfl: 0    clindamycin (CLEOCIN T) 1 % lotion, , Disp: , Rfl:     doxycycline monohydrate (MONODOX) 100 mg capsule, Take 1 capsule (100 mg total) by mouth 2 (two) times a day for 7 days, Disp: 14 capsule, Rfl: 0    FLUoxetine (PROzac) 20 mg capsule, , Disp: , Rfl:     fluticasone (FLONASE) 50 mcg/act nasal spray, 2 sprays into each nostril daily, Disp: 9.9 mL, Rfl: 0    ibuprofen (MOTRIN) 200 mg tablet, Take 200 mg by mouth every 6 (six) hours as needed, Disp: , Rfl:     lamoTRIgine (LaMICtal) 100 mg tablet, , Disp: , Rfl:     loratadine (CLARITIN) 10 mg tablet, Take 10 mg by mouth, Disp: , Rfl:     methylPREDNISolone 4 MG tablet therapy pack, Use as directed on package, Disp: 21 each, Rfl: 0    ondansetron (ZOFRAN-ODT) 4 mg disintegrating tablet, , Disp: , Rfl:     clindamycin 1 % gel, , Disp: , Rfl:     FLUoxetine (PROzac) 10 mg capsule, , Disp: , Rfl:     tretinoin (RETIN-A) 0.025 % cream, , Disp: , Rfl:     Current Allergies     Allergies as of 10/11/2024 - Reviewed 10/11/2024   Allergen Reaction Noted    Penicillins Hives and Swelling 09/07/2016     "Sulfamethoxazole Hives and Rash 09/07/2016    Sulfamethoxazole-trimethoprim Hives and Swelling 01/02/2016    Dust mite extract Other (See Comments) 12/07/2023    Pollen extract Hives 12/07/2023    Amoxicillin-pot clavulanate Dermatitis and Rash 09/07/2016    Cefdinir Dermatitis, Rash, and Swelling 09/07/2016    Clarithromycin Hives 09/07/2016            The following portions of the patient's history were reviewed and updated as appropriate: allergies, current medications, past family history, past medical history, past social history, past surgical history and problem list.     Past Medical History:   Diagnosis Date    ADHD (attention deficit hyperactivity disorder)     Anxiety     Bipolar affective (HCC)     Depression        Past Surgical History:   Procedure Laterality Date    GALLBLADDER SURGERY      2014    WISDOM TOOTH EXTRACTION         Family History   Problem Relation Age of Onset    Depression Mother     Atrial fibrillation Mother          Medications have been verified.        Objective   /82   Pulse 81   Temp (!) 97.1 °F (36.2 °C) (Tympanic)   Resp 18   Ht 5' 8\" (1.727 m)   Wt 128 kg (281 lb 9.6 oz)   SpO2 98%   BMI 42.82 kg/m²   No LMP recorded. (Menstrual status: Birth Control).       Physical Exam     Physical Exam  Vitals and nursing note reviewed.   Constitutional:       General: She is not in acute distress.     Appearance: Normal appearance. She is ill-appearing. She is not diaphoretic.   HENT:      Head: Normocephalic and atraumatic.      Right Ear: Tympanic membrane, ear canal and external ear normal. There is no impacted cerumen.      Left Ear: Tympanic membrane, ear canal and external ear normal. There is no impacted cerumen.      Nose: Congestion present. No rhinorrhea.      Right Sinus: Maxillary sinus tenderness present.      Left Sinus: Maxillary sinus tenderness present.      Mouth/Throat:      Pharynx: No posterior oropharyngeal erythema.   Eyes:      General: No scleral " icterus.        Right eye: No discharge.         Left eye: No discharge.      Conjunctiva/sclera: Conjunctivae normal.   Cardiovascular:      Rate and Rhythm: Normal rate and regular rhythm.   Pulmonary:      Effort: Pulmonary effort is normal. No respiratory distress.      Breath sounds: Normal breath sounds. No stridor. No wheezing, rhonchi or rales.   Musculoskeletal:         General: Normal range of motion.      Cervical back: Normal range of motion.   Lymphadenopathy:      Cervical: No cervical adenopathy.   Skin:     Coloration: Skin is not jaundiced or pale.      Findings: No bruising, erythema or rash.   Neurological:      General: No focal deficit present.      Mental Status: She is alert and oriented to person, place, and time.   Psychiatric:         Mood and Affect: Mood normal.         Behavior: Behavior normal.         Thought Content: Thought content normal.         Judgment: Judgment normal.

## 2024-10-11 NOTE — LETTER
October 11, 2024     Patient: Mignon Liang   YOB: 2000   Date of Visit: 10/11/2024       To Whom it May Concern:    Mignon Liang was seen in my clinic on 10/11/2024. She may return to work on 10/13/2024 .    If you have any questions or concerns, please don't hesitate to call.         Sincerely,          ADEEL Suarez

## 2024-10-11 NOTE — LETTER
October 14, 2024     Patient: Mignon Liang   YOB: 2000   Date of Visit: 10/11/2024       To Whom it May Concern:    Mignon Liang was seen in my clinic on 10/11/2024. She may return to work on 10/15/2024 .    If you have any questions or concerns, please don't hesitate to call.         Sincerely,          ADEEL Suarez

## 2024-10-12 DIAGNOSIS — J45.41 MODERATE PERSISTENT ASTHMA WITH ACUTE EXACERBATION: ICD-10-CM

## 2024-10-13 RX ORDER — ALBUTEROL SULFATE 90 UG/1
INHALANT RESPIRATORY (INHALATION)
Qty: 8.5 G | Refills: 0 | Status: SHIPPED | OUTPATIENT
Start: 2024-10-13

## 2025-01-14 ENCOUNTER — TELEPHONE (OUTPATIENT)
Dept: ADMINISTRATIVE | Facility: OTHER | Age: 25
End: 2025-01-14

## 2025-01-14 NOTE — TELEPHONE ENCOUNTER
----- Message from Marline MCQUEEN sent at 1/14/2025  8:55 AM EST -----  Regarding: Care Gap Request  Pap Smear  01/14/25 8:55 AM    Hello, our patient Mingon Liang has had Pap Smear (HPV) aka Cervical Cancer Screening completed/performed. Please assist in updating the patient chart by pulling the Care Everywhere (CE) document. The date of service is 7/19/23.     Thank you,  Marline Butcher MA  [facility]

## 2025-01-14 NOTE — TELEPHONE ENCOUNTER
Upon review of the In Basket request we were able to locate, review, and update the patient chart as requested for Pap Smear (HPV) aka Cervical Cancer Screening.    Any additional questions or concerns should be emailed to the Practice Liaisons via the appropriate education email address, please do not reply via In Basket.    Thank you  YVONNE GARSIA MA   [facility]

## 2025-01-17 ENCOUNTER — TELEPHONE (OUTPATIENT)
Age: 25
End: 2025-01-17